# Patient Record
Sex: FEMALE | Race: BLACK OR AFRICAN AMERICAN
[De-identification: names, ages, dates, MRNs, and addresses within clinical notes are randomized per-mention and may not be internally consistent; named-entity substitution may affect disease eponyms.]

---

## 2018-02-18 ENCOUNTER — HOSPITAL ENCOUNTER (INPATIENT)
Dept: HOSPITAL 96 - M.ERS | Age: 58
LOS: 6 days | Discharge: HOME HEALTH | DRG: 418 | End: 2018-02-24
Attending: INTERNAL MEDICINE | Admitting: INTERNAL MEDICINE
Payer: COMMERCIAL

## 2018-02-18 VITALS — SYSTOLIC BLOOD PRESSURE: 188 MMHG | DIASTOLIC BLOOD PRESSURE: 95 MMHG

## 2018-02-18 VITALS — BODY MASS INDEX: 37.25 KG/M2 | WEIGHT: 205 LBS | HEIGHT: 62.01 IN

## 2018-02-18 VITALS — DIASTOLIC BLOOD PRESSURE: 76 MMHG | SYSTOLIC BLOOD PRESSURE: 146 MMHG

## 2018-02-18 VITALS — SYSTOLIC BLOOD PRESSURE: 163 MMHG | DIASTOLIC BLOOD PRESSURE: 85 MMHG

## 2018-02-18 VITALS — SYSTOLIC BLOOD PRESSURE: 150 MMHG | DIASTOLIC BLOOD PRESSURE: 79 MMHG

## 2018-02-18 DIAGNOSIS — I42.9: ICD-10-CM

## 2018-02-18 DIAGNOSIS — G89.29: ICD-10-CM

## 2018-02-18 DIAGNOSIS — I50.9: ICD-10-CM

## 2018-02-18 DIAGNOSIS — I50.32: ICD-10-CM

## 2018-02-18 DIAGNOSIS — J98.11: ICD-10-CM

## 2018-02-18 DIAGNOSIS — M54.9: ICD-10-CM

## 2018-02-18 DIAGNOSIS — R09.02: ICD-10-CM

## 2018-02-18 DIAGNOSIS — Z95.0: ICD-10-CM

## 2018-02-18 DIAGNOSIS — I13.0: ICD-10-CM

## 2018-02-18 DIAGNOSIS — K21.9: ICD-10-CM

## 2018-02-18 DIAGNOSIS — N17.9: ICD-10-CM

## 2018-02-18 DIAGNOSIS — M10.9: ICD-10-CM

## 2018-02-18 DIAGNOSIS — K80.63: Primary | ICD-10-CM

## 2018-02-18 DIAGNOSIS — K44.9: ICD-10-CM

## 2018-02-18 DIAGNOSIS — E11.22: ICD-10-CM

## 2018-02-18 DIAGNOSIS — R65.10: ICD-10-CM

## 2018-02-18 DIAGNOSIS — M19.90: ICD-10-CM

## 2018-02-18 DIAGNOSIS — F32.9: ICD-10-CM

## 2018-02-18 DIAGNOSIS — I24.9: ICD-10-CM

## 2018-02-18 DIAGNOSIS — N18.9: ICD-10-CM

## 2018-02-18 LAB
ABSOLUTE BASOPHILS: 0.1 THOU/UL (ref 0–0.2)
ABSOLUTE EOSINOPHILS: 0.2 THOU/UL (ref 0–0.7)
ABSOLUTE MONOCYTES: 1.1 THOU/UL (ref 0–1.2)
ALBUMIN SERPL-MCNC: 3.2 G/DL (ref 3.4–5)
ALP SERPL-CCNC: 128 U/L (ref 46–116)
ALT SERPL-CCNC: 107 U/L (ref 30–65)
ANION GAP SERPL CALC-SCNC: 7 MMOL/L (ref 7–16)
AST SERPL-CCNC: 111 U/L (ref 15–37)
BASOPHILS NFR BLD AUTO: 0.8 %
BILIRUB SERPL-MCNC: 1.4 MG/DL
BILIRUB UR-MCNC: NEGATIVE MG/DL
BUN SERPL-MCNC: 22 MG/DL (ref 7–18)
CALCIUM SERPL-MCNC: 8.9 MG/DL (ref 8.5–10.1)
CHLORIDE SERPL-SCNC: 104 MMOL/L (ref 98–107)
CO2 SERPL-SCNC: 29 MMOL/L (ref 21–32)
COLOR UR: YELLOW
CREAT SERPL-MCNC: 1.4 MG/DL (ref 0.6–1.3)
EOSINOPHIL NFR BLD: 2.7 %
GLUCOSE SERPL-MCNC: 157 MG/DL (ref 70–99)
GRANULOCYTES NFR BLD MANUAL: 72.3 %
HCT VFR BLD CALC: 40.9 % (ref 37–47)
HGB BLD-MCNC: 13.5 GM/DL (ref 12–15)
KETONES UR STRIP-MCNC: NEGATIVE MG/DL
LIPASE: 120 U/L (ref 73–393)
LYMPHOCYTES # BLD: 1 THOU/UL (ref 0.8–5.3)
LYMPHOCYTES NFR BLD AUTO: 11.2 %
MCH RBC QN AUTO: 30.9 PG (ref 26–34)
MCHC RBC AUTO-ENTMCNC: 33.1 G/DL (ref 28–37)
MCV RBC: 93.4 FL (ref 80–100)
MONOCYTES NFR BLD: 13 %
MPV: 9.1 FL. (ref 7.2–11.1)
NEUTROPHILS # BLD: 6.2 THOU/UL (ref 1.6–8.1)
NUCLEATED RBCS: 0 /100WBC
PLATELET COUNT*: 380 THOU/UL (ref 150–400)
POTASSIUM SERPL-SCNC: 3.9 MMOL/L (ref 3.5–5.1)
PROT SERPL-MCNC: 7.6 G/DL (ref 6.4–8.2)
PROT UR QL STRIP: (no result)
RBC # BLD AUTO: 4.38 MIL/UL (ref 4.2–5)
RBC # UR STRIP: NEGATIVE /UL
RDW-CV: 15.4 % (ref 10.5–14.5)
SODIUM SERPL-SCNC: 140 MMOL/L (ref 136–145)
SP GR UR STRIP: 1.02 (ref 1–1.03)
TROPONIN-I LEVEL: <0.06 NG/ML (ref ?–0.06)
URINE CLARITY: CLEAR
URINE GLUCOSE-RANDOM: NEGATIVE
URINE LEUKOCYTES-REFLEX: NEGATIVE
URINE NITRITE-REFLEX: NEGATIVE
UROBILINOGEN UR STRIP-ACNC: >= 8 E.U./DL (ref 0.2–1)
WBC # BLD AUTO: 8.5 THOU/UL (ref 4–11)

## 2018-02-18 NOTE — NUR
PATIENT ADMITTED FROM ER TO ROOM 114. ALERT AND ORIENTED. RUQ PAIN PARTIALLY
CONTROLLED WITH MORPHINE. NAUSEATED- ZOFRAN GIVEN. IVF AND FLAGYL INFUSING AT
THIS TIME. PATIENT HAS H/O CHF-INFORMED DR. SPENCER THAT FLUIDS ARE RUNNING
AT 150ML/HR, AND PATIENT HAS WHEEZES AND PRODUCTIVE COUGH-ORDERS TO DECREASE
FLUIDS TO 75ML/HR, AND GIVE LASIX IV X1. ADMISSION HISTORY AND ASSESSMENT AS
CHARTED. VSS. O2 SAT ON2L- 96%. PATIENT VOIDED UPON ARRIVAL. ORIETNED TO ROOM.
ORAL SWABS PROVIDED.GI AND SURGERY CONSULTED.  CALL LIGHT WITHIN REACH. WILL
CONTINUE TO MONITOR.

## 2018-02-18 NOTE — NUR
I WAS PAGED BY ORTHO WHILE IN THE ER AND RETURNED THE CALL. I WAS TOLD THAT
THE PT  DID NOT RECIEVE A BREATHING TREATMENT.  I WAS UNAWARE OF ANY
TREATMENTS BEING DUE ON THAT FLOOR AS THE BOARD IN OUR DEPT DID NOT REFLECT
ANY PT'S.  I WAS TOLD THAT WAS NOT CORRECT. I STATED I WOULD HEAD DOWN THERE
AFTER I HAD FINISHED IN THE ER.  WHEN I WAS DONE IN THE ER I LOOKED UP THE PT
ON ORTHO AND REALIZED IT WAS A NEW ORDER AND WAS (QID) WITH NO (PRN) ORDERS.
THE FIRST TREATMENT WAS DUE AT 2000 (8PM).  I CALLED THE ORTHO FLOOR TO
EXPLAIN THE CONFLICT AND SUGGESTED A PRN ORDER WOULD BE HELPFUL IN THIS
SITUATION.

## 2018-02-18 NOTE — PROC
[address]  [address]                    PROCEDURE REPORT              
_______________________________________________________________________________
 
Name:       MOON CLARK                  Room:           [address] [place]  
.R.#:  [id number]      Account #:      [id number]  
Admission:  02/18/18     Attend Phys:    Jerson Gallagher MD 
Discharge:               Date of Birth:  [birth year]  
         Report #: [id number]
                                                                                
_______________________________________________________________________________
THIS REPORT FOR:  //name//                      
 
For GI report, please see the Provation report in Perceptive 7 content.
 
 
 
 
 
 
 
 
 
 
 
 
 
 
 
 
 
 
 
 
 
 
 
 
 
 
 
 
 
 
 
 
 
 
 
 
 
 
 
 
 
                       
                                        By:                                
                 
D: 02/20/18     _______________________________________
T: 02/23/18 0636Medical Records Staff JA       /AL

## 2018-02-19 VITALS — DIASTOLIC BLOOD PRESSURE: 90 MMHG | SYSTOLIC BLOOD PRESSURE: 177 MMHG

## 2018-02-19 VITALS — SYSTOLIC BLOOD PRESSURE: 154 MMHG | DIASTOLIC BLOOD PRESSURE: 79 MMHG

## 2018-02-19 VITALS — DIASTOLIC BLOOD PRESSURE: 58 MMHG | SYSTOLIC BLOOD PRESSURE: 170 MMHG

## 2018-02-19 VITALS — DIASTOLIC BLOOD PRESSURE: 80 MMHG | SYSTOLIC BLOOD PRESSURE: 160 MMHG

## 2018-02-19 VITALS — SYSTOLIC BLOOD PRESSURE: 162 MMHG | DIASTOLIC BLOOD PRESSURE: 78 MMHG

## 2018-02-19 LAB
ABSOLUTE BASOPHILS: 0.1 THOU/UL (ref 0–0.2)
ABSOLUTE EOSINOPHILS: 0.3 THOU/UL (ref 0–0.7)
ABSOLUTE MONOCYTES: 1.4 THOU/UL (ref 0–1.2)
ALBUMIN SERPL-MCNC: 3.1 G/DL (ref 3.4–5)
ALP SERPL-CCNC: 96 U/L (ref 46–116)
ALT SERPL-CCNC: 123 U/L (ref 30–65)
ANION GAP SERPL CALC-SCNC: 10 MMOL/L (ref 7–16)
AST SERPL-CCNC: 85 U/L (ref 15–37)
BASOPHILS NFR BLD AUTO: 1.1 %
BILIRUB SERPL-MCNC: 0.8 MG/DL
BUN SERPL-MCNC: 18 MG/DL (ref 7–18)
CALCIUM SERPL-MCNC: 8.6 MG/DL (ref 8.5–10.1)
CHLORIDE SERPL-SCNC: 106 MMOL/L (ref 98–107)
CO2 SERPL-SCNC: 28 MMOL/L (ref 21–32)
CREAT SERPL-MCNC: 1.3 MG/DL (ref 0.6–1.3)
EOSINOPHIL NFR BLD: 3.5 %
GLUCOSE SERPL-MCNC: 90 MG/DL (ref 70–99)
GRANULOCYTES NFR BLD MANUAL: 66 %
HCT VFR BLD CALC: 36.5 % (ref 37–47)
HGB BLD-MCNC: 12.1 GM/DL (ref 12–15)
LYMPHOCYTES # BLD: 1 THOU/UL (ref 0.8–5.3)
LYMPHOCYTES NFR BLD AUTO: 12.1 %
MAGNESIUM SERPL-MCNC: 1.6 MG/DL (ref 1.8–2.4)
MCH RBC QN AUTO: 30.8 PG (ref 26–34)
MCHC RBC AUTO-ENTMCNC: 33.1 G/DL (ref 28–37)
MCV RBC: 93 FL (ref 80–100)
MONOCYTES NFR BLD: 17.3 %
MPV: 8.9 FL. (ref 7.2–11.1)
NEUTROPHILS # BLD: 5.4 THOU/UL (ref 1.6–8.1)
NUCLEATED RBCS: 0 /100WBC
PHOSPHATE SERPL-MCNC: 4.4 MG/DL (ref 2.5–4.9)
PLATELET COUNT*: 324 THOU/UL (ref 150–400)
POTASSIUM SERPL-SCNC: 4.1 MMOL/L (ref 3.5–5.1)
PROT SERPL-MCNC: 6.8 G/DL (ref 6.4–8.2)
RBC # BLD AUTO: 3.93 MIL/UL (ref 4.2–5)
RDW-CV: 14.8 % (ref 10.5–14.5)
SODIUM SERPL-SCNC: 144 MMOL/L (ref 136–145)
WBC # BLD AUTO: 8.2 THOU/UL (ref 4–11)

## 2018-02-19 NOTE — NUR
PATIENT REMAINS ALERT AND ORIENTED. PAIN CONTROLLED WITH MORPHINE. ZOFRAN FOR
NAUSEA. VOIDING PER BSC. IVF SL THIS AFTERNOON. PATIENT HAD RUQ PAIN AFTER
HEART HEALTHY DIET FOR LUNCH. DR. CHELSY DURAN, EGD PLANNED FOR TOMORROW DUE
TO HISTORY OF GASTRITIS. PATIENT WILL BE NPO AFTER 0600. POSSIBLE SURGERY WED.
DR. FOSTER CONSULTED FOR PRE OP CLEARANCE. CALL LIGHT WITHIN REACH. WILL
CONTINUE TO MONITOR.

## 2018-02-19 NOTE — NUR
SPOKE WITH DR. VALENTINO REGARDING PLANS FOR PATIENT TODAY. PER DR. VALENTINO WE CAN
DO AN MRCP IF POSSIBLE, HOWEVER PATIENT HAS DEFIBRILLATOR. SPOKE WITH RAMANA
IN MRI AND THE DEFIBRILLATOR IS NOT COMPATIBLE WITH MRI.  STATED IF
UNABLE TO DO MRI SHE CAN EAT. CALLED SURGICAL RESIDENT TO INFORM THEM OF GI'S
RECOMENDATION. AWAITING RETURN CALL.

## 2018-02-19 NOTE — NUR
UP WITH STAND BY ASSIST TO BEDSIDE COMMODE.  ALERT AND ORIENTED.  LUNG SOUNDS
COARSE AND WITH WHEEZES AT BEGINNING OF SHIFT.  BREATHING TREATMENT GIVEN AND
BY END OF SHIFT PATIENT STATED SHE WAS FEELING MUCH BETTER AND LUNGS BETTER
WITHOUT WHEEZES.  O2 SAT 93% WITH O2 AT 2L/NC.  NEW IV STARTED WITHOUT
DIFFICLUTY.  PATIENT NPO AT THIS TIME. CALL LIGHT WITHIN REACH.

## 2018-02-19 NOTE — EKG
[place] [address]
Phone:  [phone]                     ELECTROCARDIOGRAM REPORT      
_______________________________________________________________________________
 
Name:       EDUARDOMOON S                  Room:           [address]    ADM IN  
[facility]#:  [id number]      Account #:      [id number]  
Admission:  18     Attend Phys:    Jerson Gallagher MD 
Discharge:               Date of Birth:  [birth year]  
         Report #: [id number]
    [id number]
_______________________________________________________________________________
THIS REPORT FOR:  //name//                      
 
                         [place] ED
                                       
Test Date:    2018               Test Time:    10:07:49
Pat Name:     MOON CLARK              Department:   
Patient ID:   [id number]            Room:         [facility]
Gender:       F                        Technician:   [facility]
:          1960               Requested By: Magda Rocha
Order Number: [id number] MD:   Jermaine Sharpe
                                 Measurements
Intervals                              Axis          
Rate:         79                       P:            11
UT:           187                      QRS:          -41
QRSD:         113                      T:            86
QT:           433                                    
QTc:          497                                    
                           Interpretive Statements
Sinus rhythm
Probable left atrial enlargement
Left ventricular hypertrophy
Nonspecific T abnormalities, lateral leads
Borderline prolonged QT interval
Compared to ECG 08/10/2017 13:46:47
rate increased
 
Electronically Signed On 2018 10:49:19 CST by Jermaine Sharpe
[email or web address]
 
 
 
 
 
 
 
 
 
 
 
 
 
 
 
  <ELECTRONICALLY SIGNED>
                                           By: Jermaine Sharpe MD, FACC      
  18     1049
D: 18 1007   _____________________________________
T: 18 1007   Jermaine Sharpe MD, FAC        /EPI

## 2018-02-20 VITALS — SYSTOLIC BLOOD PRESSURE: 143 MMHG | DIASTOLIC BLOOD PRESSURE: 70 MMHG

## 2018-02-20 VITALS — DIASTOLIC BLOOD PRESSURE: 68 MMHG | SYSTOLIC BLOOD PRESSURE: 155 MMHG

## 2018-02-20 VITALS — DIASTOLIC BLOOD PRESSURE: 75 MMHG | SYSTOLIC BLOOD PRESSURE: 143 MMHG

## 2018-02-20 VITALS — DIASTOLIC BLOOD PRESSURE: 73 MMHG | SYSTOLIC BLOOD PRESSURE: 148 MMHG

## 2018-02-20 VITALS — SYSTOLIC BLOOD PRESSURE: 167 MMHG | DIASTOLIC BLOOD PRESSURE: 66 MMHG

## 2018-02-20 VITALS — DIASTOLIC BLOOD PRESSURE: 70 MMHG | SYSTOLIC BLOOD PRESSURE: 143 MMHG

## 2018-02-20 LAB
ABSOLUTE BASOPHILS: 0.1 THOU/UL (ref 0–0.2)
ABSOLUTE EOSINOPHILS: 0.4 THOU/UL (ref 0–0.7)
ABSOLUTE MONOCYTES: 1.4 THOU/UL (ref 0–1.2)
ALBUMIN SERPL-MCNC: 2.9 G/DL (ref 3.4–5)
ALBUMIN SERPL-MCNC: 3 G/DL (ref 3.4–5)
ALP SERPL-CCNC: 85 U/L (ref 46–116)
ALP SERPL-CCNC: 86 U/L (ref 46–116)
ALT SERPL-CCNC: 103 U/L (ref 30–65)
ALT SERPL-CCNC: 103 U/L (ref 30–65)
ANION GAP SERPL CALC-SCNC: 5 MMOL/L (ref 7–16)
AST SERPL-CCNC: 54 U/L (ref 15–37)
AST SERPL-CCNC: 55 U/L (ref 15–37)
BASOPHILS NFR BLD AUTO: 0.8 %
BILIRUB DIRECT SERPL-MCNC: 0.2 MG/DL
BILIRUB SERPL-MCNC: 0.8 MG/DL
BILIRUB SERPL-MCNC: 0.8 MG/DL
BUN SERPL-MCNC: 18 MG/DL (ref 7–18)
CALCIUM SERPL-MCNC: 8.4 MG/DL (ref 8.5–10.1)
CHLORIDE SERPL-SCNC: 106 MMOL/L (ref 98–107)
CO2 SERPL-SCNC: 31 MMOL/L (ref 21–32)
CREAT SERPL-MCNC: 1.3 MG/DL (ref 0.6–1.3)
EOSINOPHIL NFR BLD: 6 %
GLUCOSE SERPL-MCNC: 104 MG/DL (ref 70–99)
GRANULOCYTES NFR BLD MANUAL: 59.1 %
HCT VFR BLD CALC: 36.4 % (ref 37–47)
HGB BLD-MCNC: 11.8 GM/DL (ref 12–15)
LYMPHOCYTES # BLD: 1.1 THOU/UL (ref 0.8–5.3)
LYMPHOCYTES NFR BLD AUTO: 15.1 %
MCH RBC QN AUTO: 30.6 PG (ref 26–34)
MCHC RBC AUTO-ENTMCNC: 32.5 G/DL (ref 28–37)
MCV RBC: 94.1 FL (ref 80–100)
MONOCYTES NFR BLD: 19 %
MPV: 8.7 FL. (ref 7.2–11.1)
NEUTROPHILS # BLD: 4.2 THOU/UL (ref 1.6–8.1)
NUCLEATED RBCS: 0 /100WBC
PLATELET COUNT*: 292 THOU/UL (ref 150–400)
POTASSIUM SERPL-SCNC: 3.8 MMOL/L (ref 3.5–5.1)
PROT SERPL-MCNC: 6.9 G/DL (ref 6.4–8.2)
PROT SERPL-MCNC: 6.9 G/DL (ref 6.4–8.2)
RBC # BLD AUTO: 3.87 MIL/UL (ref 4.2–5)
RDW-CV: 14.7 % (ref 10.5–14.5)
SODIUM SERPL-SCNC: 142 MMOL/L (ref 136–145)
WBC # BLD AUTO: 7.2 THOU/UL (ref 4–11)

## 2018-02-20 NOTE — CON
[address]  [address]                    CONSULTATION                  
_______________________________________________________________________________
 
Name:       MOON CLARK                  Room:           [address] [place]  
.R.#:  [id number]      Account #:      [id number]  
Admission:  02/18/18     Attend Phys:    Jerson Gallagher MD 
Discharge:               Date of Birth:  [birth year]  
         Report #: [id number]
                                                                     [id number]  
_______________________________________________________________________________
THIS REPORT FOR:  //name//                      
 
CC: Jerson Terry
 
DATE OF SERVICE:  02/19/2018
 
 
HISTORY OF PRESENT ILLNESS:  The patient is a 57-year-old single black female
who was asked to see in the hospital today for preoperative evaluation.  The
patient presented in 2013 with chest pain and shortness of breath.  She saw Dr. Velez and had a heart catheterization.  This showed normal coronary arteries
and an ejection fraction of 25%.  She is felt to have a nonischemic
cardiomyopathy.  She was discharged with LifeVest.  After discharge, the
LifeVest shocked her.  She was admitted to [facility] and Dr. Garcia implanted a single lead [employer] defibrillator in 12/2013.  She
has done well since that time.  Her last echocardiogram here at [facility] was
in 06/2017 that showed an ejection fraction of only 45%.  Because of insurance,
she has recently been followed by Dr. Derick scott at [place].  She apparently
had her defibrillator checked there at [place] in December.  She denied any
recent discharges.  The patient is not very active because of chronic back pain.
 She uses a walker.  She apparently had back surgery last May at [place]. 
The patient recently has been having nausea and vomiting.  She came to the
Emergency Room yesterday.  There are plans for cholecystectomy.  I was asked to
see her for preoperative evaluation.  She denied any significant chest pain,
increased shortness of breath, edema, palpitations, recent discharge of her
defibrillator.
 
PAST MEDICAL HISTORY:  Significant for 3 back surgeries now.  She has a history
of hypertension, diabetes.  She has a history of depression.
 
CURRENT MEDICATIONS:  Include Lasix, Cymbalta, ProAir, allopurinol, carvedilol,
glimepiride, and losartan.
 
ALLERGIES:  SHE HAS INTOLERANCE TO MORPHINE.
 
FAMILY HISTORY:  Father with heart disease.
 
SOCIAL HISTORY:  She is single.  She does have 2 daughters, never been . 
Used to work in real estate.  She is now on disability.  She previously smoked
marijuana years ago, rarely drinks alcohol, no tobacco use.
 
REVIEW OF SYSTEMS:  She has had no history of stroke.  She does have history of
asthma.  No history of peptic ulcer disease or liver disease.  She has chronic
kidney disease.  She has a history of depression.  No chronic skin condition. 
She is overweight, standing 5 feet 4 inches and weighing 200 pounds.
 
 
 
[place] [address]                    CONSULTATION                  
_______________________________________________________________________________
 
Name:       MOON CLARK                  Room:           [address] [place]  
[facility]#:  [id number]      Account #:      [id number]  
Admission:  02/18/18     Attend Phys:    Jerson Gallagher MD 
Discharge:               Date of Birth:  [birth year]  
         Report #: [id number]
                                                                     [id number]  
_______________________________________________________________________________
 
PHYSICAL EXAMINATION:
GENERAL:  Revealed a middle-aged black female, lying in bed.  She appeared in no
distress.
VITAL SIGNS:  She had a blood pressure of 140/70, pulse is 80.  She is afebrile.
HEENT:  She was anicteric, conjunctiva pink.  Mucous membranes moist.
NECK:  Veins are difficult to assess due to obesity.  No carotid bruits.
CHEST:  Clear to auscultation.
HEART:  Regular rate and rhythm.
ABDOMEN:  Obese, soft, nontender.
EXTREMITIES:  Had no edema.  Dorsalis pedis pulse 1+ bilaterally.
SKIN:  Cool and dry.
NEUROLOGIC:  Nonfocal.
LYMPH:  No adenopathy.
MUSCULOSKELETAL:  No joint effusion.
 
RADIOLOGICAL DATA:  ECG showed a sinus rhythm, left axis, nonspecific ST-segment
changes, left ventricular hypertrophy.
 
LABORATORY DATA:  Sodium 144, BUN 18, glucose 90.  Her SGPT 123, SGOT 85,
bilirubin 0.8, alkaline phosphatase 96.  Troponin 0.06.  BNP 1378.  Her white
blood cell count is 8.2, hemoglobin 12.1.  Workup so far, she had abdominal
ultrasound done yesterday that showed gallstones, gallbladder wall thickening,
and possible cholecystitis.
 
IMPRESSION AND RECOMMENDATIONS:
1.  Gallstones.  The patient might require gallbladder surgery.  The patient
appears to have no cardiac contraindication to cholecystectomy, although she
will be at small cardiac risk for postoperative complications because of her
history of nonischemic cardiomyopathy and previous defibrillator.  I would
recommend turning off therapy by her defibrillator during surgery.
2.  Cardiomyopathy.  The patient is on an ARB and beta blocker.
3.  Hypertension.  Appears controlled at this time.
4.  Diabetes.
5.  Chronic back pain.
6.  History of asthma.
 
 
 
 
 
 
 
 
<ELECTRONICALLY SIGNED>
                                        By:  Jermaine Sharpe MD, FACC      
02/20/18     1736
D: 02/19/18 1125_______________________________________
T: 02/19/18 1937Jermaine Sharpe MD, FACC         /nt

## 2018-02-20 NOTE — NUR
PATIENT ALERT AND ORIENTED. VITALS STABLE. ON 1L OF OXYGEN. COMPLAINTS OF
HEADACHE AND BACK PAIN. IV PAIN MEDICATION GIVEN. ZOFRAN GIVEN
PROPHYLACTICALLY. UP SBA TO BSC. WILL BE NPO AT 0600 FOR EGD. HOURLY ROUNDS.
NURSING WILL CONTINUE TO MONITOR.

## 2018-02-20 NOTE — NUR
NURSING SUPERVISOR CALLED [facility] TO NOTIFIY THEM OF THE NEED TO
INTERROGATE DEFIBRILLATOR POST OP TOMORROW.

## 2018-02-20 NOTE — NUR
PATIENT RETURNED FROM EGD AT THIS TIME. TOLERATING LIQUIDS. ALLOPURINOL
RESTARTED THIS AFTERNOON DUE TO GOUT FLARE IN LEFT FOOT PER PATIENT. ABDOMINAL
PAIN CONTROLLED WITH MORPHINE. PATIENT WILL BE NPO AFTER MIDNIGHT FOR SURGERY
TOMORROW. PATIENT SITS EDGE OF BED FREQUENTLY. DOES NOT AMBULATE MUCH NORMALLY
DUE TO BACK PAIN AND KNEE PAIN. IV SALINE LOCKED. CALL LIGHT WITHIN REACH.
WILL CONTINUE TO MONITOR.

## 2018-02-21 VITALS — DIASTOLIC BLOOD PRESSURE: 93 MMHG | SYSTOLIC BLOOD PRESSURE: 172 MMHG

## 2018-02-21 VITALS — DIASTOLIC BLOOD PRESSURE: 73 MMHG | SYSTOLIC BLOOD PRESSURE: 148 MMHG

## 2018-02-21 VITALS — SYSTOLIC BLOOD PRESSURE: 162 MMHG | DIASTOLIC BLOOD PRESSURE: 82 MMHG

## 2018-02-21 LAB
ABSOLUTE BASOPHILS: 0.1 THOU/UL (ref 0–0.2)
ABSOLUTE EOSINOPHILS: 0.2 THOU/UL (ref 0–0.7)
ABSOLUTE MONOCYTES: 1.4 THOU/UL (ref 0–1.2)
ALBUMIN SERPL-MCNC: 3 G/DL (ref 3.4–5)
ALP SERPL-CCNC: 79 U/L (ref 46–116)
ALT SERPL-CCNC: 84 U/L (ref 30–65)
ANION GAP SERPL CALC-SCNC: 7 MMOL/L (ref 7–16)
AST SERPL-CCNC: 42 U/L (ref 15–37)
BASOPHILS NFR BLD AUTO: 1 %
BILIRUB SERPL-MCNC: 0.7 MG/DL
BUN SERPL-MCNC: 13 MG/DL (ref 7–18)
CALCIUM SERPL-MCNC: 8.4 MG/DL (ref 8.5–10.1)
CHLORIDE SERPL-SCNC: 106 MMOL/L (ref 98–107)
CO2 SERPL-SCNC: 29 MMOL/L (ref 21–32)
CREAT SERPL-MCNC: 1.4 MG/DL (ref 0.6–1.3)
EOSINOPHIL NFR BLD: 2 %
GLUCOSE SERPL-MCNC: 103 MG/DL (ref 70–99)
GRANULOCYTES NFR BLD MANUAL: 69 %
HCT VFR BLD CALC: 38.1 % (ref 37–47)
HGB BLD-MCNC: 12.2 GM/DL (ref 12–15)
LYMPHOCYTES # BLD: 0.7 THOU/UL (ref 0.8–5.3)
LYMPHOCYTES NFR BLD AUTO: 9 %
MCH RBC QN AUTO: 30.3 PG (ref 26–34)
MCHC RBC AUTO-ENTMCNC: 31.9 G/DL (ref 28–37)
MCV RBC: 95.1 FL (ref 80–100)
METAMYELOCYTES NFR BLD: 1 %
MONOCYTES NFR BLD: 18 %
MPV: 8.4 FL. (ref 7.2–11.1)
NEUTROPHILS # BLD: 5.5 THOU/UL (ref 1.6–8.1)
NUCLEATED RBCS: 0 /100WBC
PLATELET # BLD EST: ADEQUATE 10*3/UL
PLATELET COUNT*: 294 THOU/UL (ref 150–400)
POTASSIUM SERPL-SCNC: 3.5 MMOL/L (ref 3.5–5.1)
PROT SERPL-MCNC: 7 G/DL (ref 6.4–8.2)
RBC # BLD AUTO: 4.01 MIL/UL (ref 4.2–5)
RBC MORPH BLD: NORMAL
RDW-CV: 14.9 % (ref 10.5–14.5)
SODIUM SERPL-SCNC: 142 MMOL/L (ref 136–145)
WBC # BLD AUTO: 7.8 THOU/UL (ref 4–11)

## 2018-02-21 NOTE — NUR
PATIENT REMAINED ALERT AND ORIENTED X'S 4. VITAL SIGNS AND SPO2 STABLE. NO IV.
PAIN CONTROLLED WITH PAIN MEDS. PATIENT LEFT UNIT AT 1400 FOR PACU, HAS NOT
RETURNED TO UNIT YET. COMPLETED HOURLY ROUNDING. WILL CONTINUE TO MONITOR.

## 2018-02-21 NOTE — OP
[address]  [address]                    OPERATIVE REPORT              
_______________________________________________________________________________
 
Name:       MOON CLARK                  Room:           [address] [place]  
.R.#:  [id number]      Account #:      [id number]  
Admission:  02/18/18     Attend Phys:    Jerson Gallagher MD 
Discharge:               Date of Birth:  [birth year]  
         Report #: [id number]
                                                                     [id number]  
_______________________________________________________________________________
THIS REPORT FOR:  //name//                      
 
CC: Jerson Terry
 
DATE OF SERVICE:  02/21/2018
 
 
PREPROCEDURE DIAGNOSES:  Acute cholecystitis with cholelithiasis without
obstruction with dilated common bile duct and elevated liver function tests.
 
POSTOPERATIVE DIAGNOSES:  Acute cholecystitis with cholelithiasis without
obstruction with dilated common bile duct and elevated liver function tests.
 
FINDINGS:  Distended gallbladder with some mild bile staining around the
gallbladder.  There were several small stones identified within the gallbladder.
 Intraoperative cholangiogram did identify dilated common bile duct with no
signs of obstruction.  There was free flow of contrast into the duodenum.
 
SURGEON:  Emma Farnsworth DO.
 
COSURGEON:  Niall Mercado, PGY-1.
 
ASSISTANT:  SALLY Turner.
 
PROCEDURE PERFORMED:  Laparoscopic cholecystectomy with intraoperative
cholangiogram and surgeon interpretation of images.
 
ANESTHESIA:  General endotracheal and local.
 
ESTIMATED BLOOD LOSS:  5 mL.
 
DRAINS:  None.
 
SPECIMENS:  Gallbladder.
 
COMPLICATIONS:  None.
 
CONDITION:  Stable.
 
DISPOSITION:  PACU to the floor.
 
HISTORY OF PRESENT ILLNESS:  The patient is a very pleasant 57-year-old female
who presented to the hospital with a complaint of right upper quadrant abdominal
pain associated with nausea.  She underwent a CT scan and an ultrasound with
findings of a dilated gallbladder with stones in the neck of the gallbladder. 
 
 
 
[id number] [id number]                    OPERATIVE REPORT              
_______________________________________________________________________________
 
Name:       MOON CLARK                  Room:           [address] [place]  
[facility].#:  [id number]      Account #:      [id number]  
Admission:  02/18/18     Attend Phys:    Jerson Gallagher MD 
Discharge:               Date of Birth:  [birth year]  
         Report #: [id number]
                                                                     [id number]  
_______________________________________________________________________________
CBD was also found to be dilated at 10 mm.  Her LFTs were also elevated.  She
was unable to undergo an MRCP due to a defibrillator placement.  GI was
consulted.  LFTs were trended and did improve.  She underwent an EGD yesterday
with no acute findings.  She was then consented for laparoscopic cholecystectomy
with intraoperative cholangiogram.  Risks discussed included bleeding,
infection, pain, scar formation, injury to bowel, liver or bile duct, hernia at
the incision sites, need for an open procedure and risks of general anesthesia. 
The patient understood these risks and elected to proceed.
 
DESCRIPTION OF PROCEDURE:  The patient was brought to the operating room.  She
was laid supine on the operating room table.  SCDs were placed on bilateral
lower extremities.  Antibiotics were given in the perioperative period.  General
endotracheal anesthesia was induced by Anesthesia without difficulty.  Abdomen
was prepped and draped in standard sterile fashion.  Timeout was performed to
verify patient and procedure.  A 10 mL 0.5% Marcaine was injected in the
supraumbilical area.  Incision was made with an 11 blade.  Cautery was used for
hemostasis.  S retractors were used to visualize the fascia.  Fascia was grasped
and elevated between two Kochers.  Fascia was incised using cautery.  Peritoneum
was bluntly entered using a Jaycee clamp.  Finger was swept into the abdomen to
assure that there were no manuela-incisional adhesions.  Adhesions were identified.
 They appeared to be omentum.  These were gently swept to the side.  Two
stitches of 0 Vicryl were then placed on the fascia.  Concepción trocar was
introduced and secured with 0 Vicryl stitches.  Abdomen was insufflated.  The
patient was placed head up and tilted left side down.  Camera was introduced and
a brief anterior abdominal exploration was undertaken with findings of a dilated
gallbladder with some mild bile staining in the right upper quadrant and again
there were a few adhesions between the omentum and the pelvis.  An 11 mm trocar
was introduced in the subxiphoid area as well as two 5 mm trocars in the right
upper quadrant.  Gallbladder was grasped and elevated.  Peritoneum overlying the
triangle of Calot was incised using cautery.  Both duct and artery were then
easily visualized, both were circumferentially dissected using a [place]
dissector.  Sagastume clamp was then introduced through a 5 mm trocar and the neck
of the gallbladder was grasped without difficulty.  Self-penetrating catheter
was also introduced and bile was easily aspirated.  Saline was easily flushed. 
The patient was placed supine and C-arm was brought onto the field. 
Intraoperative cholangiogram was then performed without difficulty.  The cystic
duct was patent, common bile duct and the left and right hepatic radicals were
easily identified.  Contrast freely flowed into the duodenum.  There was no sign
of any obstruction or retained stone, but the common bile duct was dilated. 
Cholangiogram catheter was removed as was the Sagastume clamp.  The patient was
returned to reverse Trendelenburg and left side down.  Three clips were then
placed proximally and distally on the duct, 2 clips were placed proximally and
distally on the artery.  Both were sharply incised with scissors.  Gallbladder
was then removed from the liver bed utilizing cautery with no further
difficulty.  Specimen was placed within an EndoCatch bag.  Right upper quadrant
was irrigated until clear.  Liver bed was inspected.  It was hemostatic.  Clips
 
 
 
[place] [id number]                    OPERATIVE REPORT              
_______________________________________________________________________________
 
Name:       MOON CLARK                  Room:           [address] [place]  
[facility]#:  [id number]      Account #:      [id number]  
Admission:  02/18/18     Attend Phys:    Jerson Gallagher MD 
Discharge:               Date of Birth:  [birth year]  
         Report #: [id number]
                                                                     [id number]  
_______________________________________________________________________________
were inspected.  They appeared to be intact.  There was no bleeding or leakage
noted from the area of the clips.  Trocars were removed under direct
visualization.  There was no bleeding noted from the peritoneum.  Abdomen was
then completely desufflated.  Concepción trocar was removed and EndoCatch bag was
removed with specimen intact.  Kochers were placed on the fascia of right
infraumbilical port.  Previously placed 0 Vicryl stitches were removed and a 0
Vicryl stitch was placed in figure-of-eight fashion with excellent approximation
of the fascia.  An additional 10 mL of 0.5% Marcaine was injected in the fascia.
 This wound was then closed in a layered fashion using deep and superficial
stitches of 3-0 Vicryl in inverted interrupted fashion.  All skin wounds were
closed with 4-0 Monocryl.  A total of 50 mL of 0.5% Marcaine was used to
anesthetize the wounds.  Wounds were then cleansed and covered with Mastisol,
Steri-Strips, 4 x 4s, and a Tegaderm.  The patient was then allowed to awaken
from anesthesia, was extubated and transported to the recovery room with no
further difficulties.  Counts were correct at the conclusion of the case.
 
 
 
 
 
 
 
 
 
 
 
 
 
 
 
 
 
 
 
 
 
 
 
 
 
 
 
 
 
<ELECTRONICALLY SIGNED>
                                        By:  Emma Farnsworth,          
02/21/18     1730
D: 02/21/18 1700_______________________________________
T: 02/21/18 1722Cjer Farnsworth DO            /nt

## 2018-02-21 NOTE — NUR
PATIENT ALERT AND ORIENTED. VITALS STABLE. RA. COMPLAINTS OF CHRONIC BACK AND
LEFT FOOT PAIN. MORPHINE GIVEN, EFFECTIVE. UP SBA TO BSC. WILL BE NPO AT 0700
FOR [facility] [place]. HOURLY ROUNDS. NURSING WILL CONTINUE TO MONITOR.

## 2018-02-22 VITALS — DIASTOLIC BLOOD PRESSURE: 74 MMHG | SYSTOLIC BLOOD PRESSURE: 158 MMHG

## 2018-02-22 VITALS — DIASTOLIC BLOOD PRESSURE: 77 MMHG | SYSTOLIC BLOOD PRESSURE: 147 MMHG

## 2018-02-22 VITALS — DIASTOLIC BLOOD PRESSURE: 55 MMHG | SYSTOLIC BLOOD PRESSURE: 127 MMHG

## 2018-02-22 VITALS — DIASTOLIC BLOOD PRESSURE: 75 MMHG | SYSTOLIC BLOOD PRESSURE: 160 MMHG

## 2018-02-22 LAB
ALBUMIN SERPL-MCNC: 3.1 G/DL (ref 3.4–5)
ALP SERPL-CCNC: 95 U/L (ref 46–116)
ALT SERPL-CCNC: 100 U/L (ref 30–65)
ANION GAP SERPL CALC-SCNC: 8 MMOL/L (ref 7–16)
AST SERPL-CCNC: 73 U/L (ref 15–37)
BILIRUB SERPL-MCNC: 0.8 MG/DL
BUN SERPL-MCNC: 13 MG/DL (ref 7–18)
CALCIUM SERPL-MCNC: 8.5 MG/DL (ref 8.5–10.1)
CHLORIDE SERPL-SCNC: 102 MMOL/L (ref 98–107)
CO2 SERPL-SCNC: 30 MMOL/L (ref 21–32)
CREAT SERPL-MCNC: 1.6 MG/DL (ref 0.6–1.3)
GLUCOSE SERPL-MCNC: 187 MG/DL (ref 70–99)
HCT VFR BLD CALC: 36.9 % (ref 37–47)
HGB BLD-MCNC: 11.9 GM/DL (ref 12–15)
MCH RBC QN AUTO: 30.6 PG (ref 26–34)
MCHC RBC AUTO-ENTMCNC: 32.2 G/DL (ref 28–37)
MCV RBC: 95 FL (ref 80–100)
MPV: 8.7 FL. (ref 7.2–11.1)
PLATELET COUNT*: 284 THOU/UL (ref 150–400)
POTASSIUM SERPL-SCNC: 3.2 MMOL/L (ref 3.5–5.1)
PROT SERPL-MCNC: 7.3 G/DL (ref 6.4–8.2)
RBC # BLD AUTO: 3.88 MIL/UL (ref 4.2–5)
RDW-CV: 14.8 % (ref 10.5–14.5)
SODIUM SERPL-SCNC: 140 MMOL/L (ref 136–145)
WBC # BLD AUTO: 14.9 THOU/UL (ref 4–11)

## 2018-02-22 NOTE — NUR
PATIENT REMAINED ALERT AND ORIENTED X'S 4. VITAL SIGNS AND SPO2 STABLE. PAIN
WELL CONTROLLED WITH PAIN MEDS. PATIENT WAS UNABLE TO URINATE THROUGHOUT
SHIFT. SHE WAS STRAIGHT CATHED  CAME OUT, THEN BLADDER SCANNED HER,
NOTHING WAS LEFT IN THE BLADDER. SHE MOVES PRETTY SLOW BUT IS STABLE, 1
ASSIST. DRESSING OVER ABDOMEN CLEAN, DRY, INTACT. IV FLUSHING, CLEAN, INTACT.
COMPLETED HOURLY ROUNDING. CALL LIGHT WITHIN REACH. WILL CONTINUE TO MONITOR.

## 2018-02-22 NOTE — NUR
PT.SITTING ON SIDE OF BED. STATED SHE IS FEELING BETTER. HOPES TO GO HOME
TOMORROW. STATED DAUGHTER SHAMA LIVES WITH HER IN AN APT. HER DAUGHTER HELPS
HER AS NEEDED. THERE ARE 14 STAIRS FROM MAIN LEVEL OF APT.TO PT.'S BEDROOM.
SHE SAID SHE CRAWLS UP THE STAIRS. HAS A HX OF BACK SURGERY. SHE IS ABLE TO
WALK DOWN THEM. SHE HAS A WC,ROLLATOR WALKER AND WC. SHE STAYS IN HER ROOM
MOST OF THE TIME. DAUGHTER BRINGS FOOD UP TO HER FOR MEALS. THERE IS A
BATHROOM ON UPPER LEVEL. SHE MAINLY TAKES SPONGE BATHS. SHE HAS A BATH BENCH
BUT DAUGHTER DOESN'T LIKE IT TO STAY IN THE BATHROOM SO IT HAS TO BE CARRIED
IN THERE EACH TIME PT.WANTS TO TAKE A BATH. SHE SAID I DON'T LIKE TO BOTHER
HER FOR IT.SHE WOULD BE AGREEABLE TO HOME HEALTH. [facility] AND WOULD
LIKE TO [facility] AGAIN. CM WILL FOLLOW FOR DISCHARGE.

## 2018-02-23 VITALS — DIASTOLIC BLOOD PRESSURE: 72 MMHG | SYSTOLIC BLOOD PRESSURE: 165 MMHG

## 2018-02-23 VITALS — SYSTOLIC BLOOD PRESSURE: 138 MMHG | DIASTOLIC BLOOD PRESSURE: 55 MMHG

## 2018-02-23 VITALS — DIASTOLIC BLOOD PRESSURE: 81 MMHG | SYSTOLIC BLOOD PRESSURE: 136 MMHG

## 2018-02-23 VITALS — DIASTOLIC BLOOD PRESSURE: 59 MMHG | SYSTOLIC BLOOD PRESSURE: 121 MMHG

## 2018-02-23 VITALS — SYSTOLIC BLOOD PRESSURE: 134 MMHG | DIASTOLIC BLOOD PRESSURE: 76 MMHG

## 2018-02-23 LAB
ABSOLUTE BASOPHILS: 0.1 THOU/UL (ref 0–0.2)
ABSOLUTE EOSINOPHILS: 0.3 THOU/UL (ref 0–0.7)
ABSOLUTE MONOCYTES: 1.6 THOU/UL (ref 0–1.2)
ANION GAP SERPL CALC-SCNC: 20 MMOL/L (ref 7–16)
BASOPHILS NFR BLD AUTO: 1 %
BUN SERPL-MCNC: 13 MG/DL (ref 7–18)
CALCIUM SERPL-MCNC: 8.7 MG/DL (ref 8.5–10.1)
CHLORIDE SERPL-SCNC: 105 MMOL/L (ref 98–107)
CO2 SERPL-SCNC: 17 MMOL/L (ref 21–32)
CREAT SERPL-MCNC: 1.3 MG/DL (ref 0.6–1.3)
EOSINOPHIL NFR BLD: 2 %
GLUCOSE SERPL-MCNC: 95 MG/DL (ref 70–99)
GRANULOCYTES NFR BLD MANUAL: 79 %
HCT VFR BLD CALC: 35.2 % (ref 37–47)
HGB BLD-MCNC: 11.4 GM/DL (ref 12–15)
LYMPHOCYTES # BLD: 1 THOU/UL (ref 0.8–5.3)
LYMPHOCYTES NFR BLD AUTO: 7 %
MCH RBC QN AUTO: 30.1 PG (ref 26–34)
MCHC RBC AUTO-ENTMCNC: 32.5 G/DL (ref 28–37)
MCV RBC: 92.7 FL (ref 80–100)
MONOCYTES NFR BLD: 11 %
MPV: 8.4 FL. (ref 7.2–11.1)
NEUTROPHILS # BLD: 11.8 THOU/UL (ref 1.6–8.1)
NUCLEATED RBCS: 0 /100WBC
PLATELET # BLD EST: ADEQUATE 10*3/UL
PLATELET COUNT*: 265 THOU/UL (ref 150–400)
POTASSIUM SERPL-SCNC: 4.1 MMOL/L (ref 3.5–5.1)
RBC # BLD AUTO: 3.8 MIL/UL (ref 4.2–5)
RBC MORPH BLD: NORMAL
RDW-CV: 14.7 % (ref 10.5–14.5)
SODIUM SERPL-SCNC: 142 MMOL/L (ref 136–145)
WBC # BLD AUTO: 14.9 THOU/UL (ref 4–11)

## 2018-02-23 NOTE — S
[address]  [address]                    SURGICAL PATH RPT PROCEDURE   
_______________________________________________________________________________
 
Name:       MOON CYR                  Room:           [address] [place]  
..#:  [id number]      Account #:      [id number]  
Admission:  02/18/18     Date of Birth:  [birth year]  
Discharge:                             Report #:    [id number]
                                                         Path Case #: [id number] 
_______________________________________________________________________________
 
  
PATHOLOGY REPORT 
    
COLLECTION DATE: 2/21/2018   RECEIVED DATE: 2/22/2018  
 SUBMITTING PHYS: Dr. Emma Farnsworth
OTHER PHYS:
Dr. Jerson Terry
   
SPECIMEN(S) RECEIVED:
A.Gallbladder
    
* * * * * * * * * * * *
   
FINAL DIAGNOSIS:
Gallbladder:
- Chronic cholecystitis with cholesterolosis.
(DANA:db; 2/23/2018) 
 
PATHOLOGIST:   Tyrone Dominguez M.D. 
REPORT ELECTRONICALLY SIGNED BY:   Tyrone Dominguez M.D.
DATE/TIME:   2/23/2018 14:03
    
* * * * * * * * * * * *
 
GROSS PATHOLOGY:
Received in formalin labeled "Moon Cyr gallbladder" and consists
of an intact 8.7 x 4.6 cm gallbladder.  The serosa is glistening and
dark green.  The margin is inked.  The lumen contains 20 cc of thick
green bile.  No calculi are present.  The wall averages 0.2 cm thick.
 The mucosa is velvety, green, and shows staining yellow flecks. 
Representative sections are submitted as A1.
(DUSTIN; 2/22/2018)
 
 
CLINICAL HISTORY:
Acute cholecystitis
 
INITIAL CPT CODE(S):
A; 64775
Professional services performed by [facility] at [facility], [address] [place] [address]., [place] [address].  Technical
services performed by [facility] at [address] [place] [address], [facility] [address] [address].
 
 
   
[facility]
 
[facility] 
[address] [place]  [address]                    SURGICAL PATH RPT PROCEDURE   
_______________________________________________________________________________
 
Name:       MOON CYR                  Room:           [address] [place]  
..#:  [id number]      Account #:      [id number]  
Admission:  02/18/18     Date of Birth:  [birth year]  
Discharge:                             Report #:    [id number]
                                                         Path Case #: [id number] 
_______________________________________________________________________________
[address] [address]
PHONE:  [phone]
DIRECTOR:  Spencer W. Kerley, M.D.
* * *  END OF REPORT  * * *

## 2018-02-23 NOTE — NUR
PATIENT HAS REMAINED ALERT AND ORIENTED X 4 THROUGHOUT THE SHIFT AND RESTING
QUIETLY ON HOURLY ROUNDS. MEDICATED X 4 FOR ABDOMINAL PAIN. O2 HAS BEEN
DECREASED TO 1.5 L/MIN WITH SATS >92%. RT TREATMENTS CONTINUE. LAP SITES TO
ABDOMEN CLEAN AND DRY. NO NAUSEA. PASSING SOME GAS. TOLERATING CLEAR DIET. PER
DAYSHIFT REPORT PATIENT HAD BEEN UNABLE TO VOID POST-OP AND STRAIGHT CATH X 2
HAD BEEN PROVIDED. THE LAST PER REPORT 1600 2/22/18 WITH 300 ML RETURN. AT
MIDNIGHT PATIENT UP TO [facility] AND WAS ABLE TO VOID 150 ML AND AGAIN AT 0600 A
VOID  ML. POST VOID RESIDUAL 0600 SHOWING 345 ML. WILL COMMUNICATE WITH
PHYSICIAN IF FURTHER STRAIGHT CATH TO BE PROVIDED OR TO GIVE PATIENT FURTHER
TIME TO VOID ON OWN. VITAL SIGNS STABLE. CONTINUE TO MONITOR.

## 2018-02-23 NOTE — NUR
ASSUMED CARE OF PATIENT AFTER REPORT THIS MORNING.  PATIENT AWAKE, ALERT, AND
ORIENTED APPROPRIATELY.  PHYSICAL ASSESSMENT COMPLETED AND AS CHARTED.
COMPLAINED OF PAIN THIS SHIFT.  GIVEN PRN AND SCHEDULED MEDICATIONS, SEE EMAR
FOR DOCUMENTATION.  VITAL SIGNS STABLE.  OXYGEN SATURATION WITHIN NORMAL
LIMITS ON 1.5 LPM PER NASAL CANULA.  PATIENT TRANSFERS AND AMBULATES WITH
ASSISTANCE FROM STAFF.  HAD SHOWER TODAY WITH OCCUPATIONAL THERAPY.  IS
SITTING IN WHEELCHAIR AT BEDSIDE AT THIS TIME PER PATIENT REQUEST.  DENIES
NEEDS AT THIS TIME.  CALL LIGHT WITHIN REACH, USES APPROPRIATELY.  NURSING
WILL CONTINUE TO MONITOR.

## 2018-02-23 NOTE — NUR
REFERRAL MADE TO [facility] FOR WHEN PT.DISCHARGES.
IF DISCHARGED OVER THE WEEKEND, NOTIFY [phone] AND FAX DISCHARGE
ORDERS -[phone].

## 2018-02-24 VITALS — DIASTOLIC BLOOD PRESSURE: 93 MMHG | SYSTOLIC BLOOD PRESSURE: 147 MMHG

## 2018-02-24 VITALS — DIASTOLIC BLOOD PRESSURE: 50 MMHG | SYSTOLIC BLOOD PRESSURE: 136 MMHG

## 2018-02-24 NOTE — NUR
PATIENT HAS REMAINED ALERT AND ORIENTED X 4 THROUGHOUT THE SHIFT AND RESTING
QUIETLY ON HOURLY ROUNDS. IMPROVED PAIN MANAGEMENT WITH ORAL MEDS ONLY.
VOIDING ADEQUATELY AND PASSING GAS. HAS DENIED NAUSEA. LAP DRESSINGS X 3 CLEAN
AND DRY. VITAL SIGNS STABLE. CONTINUE TO MONITOR.

## 2018-02-24 NOTE — NUR
ORDERS RECEIVED FOR DC HOME WITH HH.  PT HAD PREVIOUSLY CHOSEN [facility], CONFIRMED
[facility] HER THAT IS STILL WHO SHE WANTS TO USE.  CALLED AND FAXED ORDERS TO [facility],
HAD TO LEAVE [facility]. PT DENIES OTHER NEEDS

## 2018-02-24 NOTE — NUR
ASSUMED CARE OF PATIENT AFTER MORNING REPORT. ALERT AND ORIENTED X4.
ASSESSMENT COMPLETED AND AS CHARTED. VSS ON 1.5 LITERS 02. PATIENT HAS HAD NO
COMPLAINTS OF NAUSEA THIS SHIFT. PAIN HAS BEEN MANAGED WITH PAIN MEDICATION.
PATIENT TITRATED OFF OF 02 AND PASSED THE REST AND EXERCIZE WITH RT. PATIENT
DISCHARGED AT 1625. ALL PERSONAL BELONGINGS LEFT WITH PATIENT. PRESCRIPTIONS
AND DISCHARGE INFORMATION SENT WITH PATIENT UPON DISCHARGE.

## 2018-03-08 NOTE — CON
[address]  [address]                    CONSULTATION                  
_______________________________________________________________________________
 
Name:       MOON CLARK                  Room:           [address] [place]  
M.R.#:  [id number]      Account #:      [id number]  
Admission:  02/18/18     Attend Phys:    Jerson Gallagher MD 
Discharge:  02/24/18     Date of Birth:  [birth year]  
         Report #: [id number]
                                                                     [id number]  
_______________________________________________________________________________
THIS REPORT FOR:  //name//                      
 
CC: Jerson Terry
 
DATE OF SERVICE:  02/19/2018
 
 
ADDENDUM:
 
I personally seen and examined the patient and reviewed labs and imaging.  The
patient with history of upper and lower endoscopy last year, which was
significant for diverticulosis and gastritis, who presents with few weeks of
abdominal pain.  She also gave us a history of taking Naprosyn for a month that
she finally stopped due to dyspepsia type symptoms.  Since hospitalization, she
had imaging studies suggestive of dilated common bile duct to 10 mm.  Her LFTs
were also elevated and bilirubin initially was 1.3 and down to 0.8.  She
currently feeling a little bit better and she has tolerated her meal.
 
We will go ahead and perform an upper endoscopy to rule out gastroduodenal
ulcer.  In reference to her abnormal liver enzymes, we will recommend lap digna
with IOC.  If the patient had choledocholithiasis, we will consider ERCP with
stone extraction.
 
 
 
 
 
 
 
 
 
 
 
 
 
 
 
 
 
 
 
 
 
<ELECTRONICALLY SIGNED>
                                        By:  Heather Mendes MD            
03/08/18     1450
D: 02/19/18 1414_______________________________________
T: 02/19/18 2141Heather Mendes MD               /nt

## 2018-09-30 ENCOUNTER — HOSPITAL ENCOUNTER (INPATIENT)
Dept: HOSPITAL 96 - M.ERS | Age: 58
LOS: 4 days | Discharge: HOME HEALTH | DRG: 391 | End: 2018-10-04
Attending: FAMILY MEDICINE | Admitting: FAMILY MEDICINE
Payer: COMMERCIAL

## 2018-09-30 VITALS — SYSTOLIC BLOOD PRESSURE: 198 MMHG | DIASTOLIC BLOOD PRESSURE: 98 MMHG

## 2018-09-30 VITALS — HEIGHT: 64.02 IN | WEIGHT: 195 LBS | BODY MASS INDEX: 33.29 KG/M2

## 2018-09-30 DIAGNOSIS — Z80.3: ICD-10-CM

## 2018-09-30 DIAGNOSIS — K57.92: Primary | ICD-10-CM

## 2018-09-30 DIAGNOSIS — I13.0: ICD-10-CM

## 2018-09-30 DIAGNOSIS — F41.9: ICD-10-CM

## 2018-09-30 DIAGNOSIS — R59.1: ICD-10-CM

## 2018-09-30 DIAGNOSIS — N18.3: ICD-10-CM

## 2018-09-30 DIAGNOSIS — M19.90: ICD-10-CM

## 2018-09-30 DIAGNOSIS — I50.23: ICD-10-CM

## 2018-09-30 DIAGNOSIS — Z79.899: ICD-10-CM

## 2018-09-30 DIAGNOSIS — J96.91: ICD-10-CM

## 2018-09-30 DIAGNOSIS — Z82.49: ICD-10-CM

## 2018-09-30 DIAGNOSIS — F32.9: ICD-10-CM

## 2018-09-30 DIAGNOSIS — I25.10: ICD-10-CM

## 2018-09-30 DIAGNOSIS — M10.9: ICD-10-CM

## 2018-09-30 DIAGNOSIS — I49.9: ICD-10-CM

## 2018-09-30 DIAGNOSIS — E83.42: ICD-10-CM

## 2018-09-30 DIAGNOSIS — K21.9: ICD-10-CM

## 2018-09-30 DIAGNOSIS — M48.00: ICD-10-CM

## 2018-09-30 DIAGNOSIS — E11.22: ICD-10-CM

## 2018-09-30 DIAGNOSIS — Z23: ICD-10-CM

## 2018-09-30 DIAGNOSIS — Z98.41: ICD-10-CM

## 2018-09-30 DIAGNOSIS — Z90.49: ICD-10-CM

## 2018-09-30 DIAGNOSIS — Z83.3: ICD-10-CM

## 2018-09-30 DIAGNOSIS — G89.29: ICD-10-CM

## 2018-09-30 DIAGNOSIS — J45.909: ICD-10-CM

## 2018-09-30 DIAGNOSIS — E87.6: ICD-10-CM

## 2018-09-30 DIAGNOSIS — R10.9: ICD-10-CM

## 2018-09-30 LAB
ABSOLUTE BASOPHILS: 0 THOU/UL (ref 0–0.2)
ABSOLUTE EOSINOPHILS: 0.1 THOU/UL (ref 0–0.7)
ABSOLUTE MONOCYTES: 0.9 THOU/UL (ref 0–1.2)
ALBUMIN SERPL-MCNC: 3.3 G/DL (ref 3.4–5)
ALP SERPL-CCNC: 94 U/L (ref 46–116)
ALT SERPL-CCNC: 73 U/L (ref 30–65)
ANION GAP SERPL CALC-SCNC: 10 MMOL/L (ref 7–16)
AST SERPL-CCNC: 41 U/L (ref 15–37)
BASOPHILS NFR BLD AUTO: 0.6 %
BILIRUB SERPL-MCNC: 0.8 MG/DL
BUN SERPL-MCNC: 15 MG/DL (ref 7–18)
CALCIUM SERPL-MCNC: 9 MG/DL (ref 8.5–10.1)
CHLORIDE SERPL-SCNC: 105 MMOL/L (ref 98–107)
CO2 SERPL-SCNC: 25 MMOL/L (ref 21–32)
CREAT SERPL-MCNC: 1.1 MG/DL (ref 0.6–1.3)
EOSINOPHIL NFR BLD: 1.2 %
GLUCOSE SERPL-MCNC: 138 MG/DL (ref 70–99)
GRANULOCYTES NFR BLD MANUAL: 76.9 %
HCT VFR BLD CALC: 41 % (ref 37–47)
HGB BLD-MCNC: 13.4 GM/DL (ref 12–15)
LYMPHOCYTES # BLD: 0.9 THOU/UL (ref 0.8–5.3)
LYMPHOCYTES NFR BLD AUTO: 10.8 %
MCH RBC QN AUTO: 30.2 PG (ref 26–34)
MCHC RBC AUTO-ENTMCNC: 32.7 G/DL (ref 28–37)
MCV RBC: 92.5 FL (ref 80–100)
MONOCYTES NFR BLD: 10.5 %
MPV: 9.5 FL. (ref 7.2–11.1)
NEUTROPHILS # BLD: 6.3 THOU/UL (ref 1.6–8.1)
NUCLEATED RBCS: 0 /100WBC
PLATELET COUNT*: 343 THOU/UL (ref 150–400)
POTASSIUM SERPL-SCNC: 3.1 MMOL/L (ref 3.5–5.1)
PROT SERPL-MCNC: 7.9 G/DL (ref 6.4–8.2)
RBC # BLD AUTO: 4.44 MIL/UL (ref 4.2–5)
RDW-CV: 14.9 % (ref 10.5–14.5)
SODIUM SERPL-SCNC: 140 MMOL/L (ref 136–145)
WBC # BLD AUTO: 8.2 THOU/UL (ref 4–11)

## 2018-10-01 VITALS — SYSTOLIC BLOOD PRESSURE: 160 MMHG | DIASTOLIC BLOOD PRESSURE: 83 MMHG

## 2018-10-01 VITALS — SYSTOLIC BLOOD PRESSURE: 188 MMHG | DIASTOLIC BLOOD PRESSURE: 92 MMHG

## 2018-10-01 VITALS — DIASTOLIC BLOOD PRESSURE: 94 MMHG | SYSTOLIC BLOOD PRESSURE: 184 MMHG

## 2018-10-01 VITALS — DIASTOLIC BLOOD PRESSURE: 82 MMHG | SYSTOLIC BLOOD PRESSURE: 164 MMHG

## 2018-10-01 VITALS — DIASTOLIC BLOOD PRESSURE: 86 MMHG | SYSTOLIC BLOOD PRESSURE: 157 MMHG

## 2018-10-01 VITALS — SYSTOLIC BLOOD PRESSURE: 170 MMHG | DIASTOLIC BLOOD PRESSURE: 86 MMHG

## 2018-10-01 VITALS — DIASTOLIC BLOOD PRESSURE: 74 MMHG | SYSTOLIC BLOOD PRESSURE: 151 MMHG

## 2018-10-01 LAB
BILIRUB UR-MCNC: NEGATIVE MG/DL
COLOR UR: YELLOW
KETONES UR STRIP-MCNC: NEGATIVE MG/DL
PROT UR QL STRIP: (no result)
RBC # UR STRIP: (no result) /UL
SP GR UR STRIP: <= 1.005 (ref 1–1.03)
URINE CLARITY: CLEAR
URINE GLUCOSE-RANDOM: NEGATIVE
URINE LEUKOCYTES-REFLEX: NEGATIVE
URINE NITRITE-REFLEX: NEGATIVE
UROBILINOGEN UR STRIP-ACNC: 0.2 E.U./DL (ref 0.2–1)

## 2018-10-02 VITALS — SYSTOLIC BLOOD PRESSURE: 168 MMHG | DIASTOLIC BLOOD PRESSURE: 82 MMHG

## 2018-10-02 VITALS — DIASTOLIC BLOOD PRESSURE: 84 MMHG | SYSTOLIC BLOOD PRESSURE: 150 MMHG

## 2018-10-02 VITALS — DIASTOLIC BLOOD PRESSURE: 119 MMHG | SYSTOLIC BLOOD PRESSURE: 198 MMHG

## 2018-10-02 VITALS — DIASTOLIC BLOOD PRESSURE: 100 MMHG | SYSTOLIC BLOOD PRESSURE: 170 MMHG

## 2018-10-02 LAB
ANION GAP SERPL CALC-SCNC: 8 MMOL/L (ref 7–16)
BUN SERPL-MCNC: 9 MG/DL (ref 7–18)
CALCIUM SERPL-MCNC: 8.2 MG/DL (ref 8.5–10.1)
CHLORIDE SERPL-SCNC: 107 MMOL/L (ref 98–107)
CO2 SERPL-SCNC: 23 MMOL/L (ref 21–32)
CREAT SERPL-MCNC: 1.2 MG/DL (ref 0.6–1.3)
EST. AVERAGE GLUCOSE BLD GHB EST-MCNC: 134 MG/DL
GLUCOSE SERPL-MCNC: 159 MG/DL (ref 70–99)
GLYCOHEMOGLOBIN (HGB A1C): 6.3 % (ref 4.8–5.6)
HCT VFR BLD CALC: 37.6 % (ref 37–47)
HGB BLD-MCNC: 12.2 GM/DL (ref 12–15)
MAGNESIUM SERPL-MCNC: 1.6 MG/DL (ref 1.8–2.4)
MAGNESIUM SERPL-MCNC: 1.7 MG/DL (ref 1.8–2.4)
MCH RBC QN AUTO: 30.2 PG (ref 26–34)
MCHC RBC AUTO-ENTMCNC: 32.5 G/DL (ref 28–37)
MCV RBC: 93 FL (ref 80–100)
MPV: 9.6 FL. (ref 7.2–11.1)
PLATELET COUNT*: 280 THOU/UL (ref 150–400)
POTASSIUM SERPL-SCNC: 3 MMOL/L (ref 3.5–5.1)
POTASSIUM SERPL-SCNC: 4 MMOL/L (ref 3.5–5.1)
RBC # BLD AUTO: 4.04 MIL/UL (ref 4.2–5)
RDW-CV: 15.6 % (ref 10.5–14.5)
SODIUM SERPL-SCNC: 138 MMOL/L (ref 136–145)
WBC # BLD AUTO: 7.1 THOU/UL (ref 4–11)

## 2018-10-03 VITALS — SYSTOLIC BLOOD PRESSURE: 193 MMHG | DIASTOLIC BLOOD PRESSURE: 103 MMHG

## 2018-10-03 VITALS — SYSTOLIC BLOOD PRESSURE: 168 MMHG | DIASTOLIC BLOOD PRESSURE: 89 MMHG

## 2018-10-03 VITALS — DIASTOLIC BLOOD PRESSURE: 90 MMHG | SYSTOLIC BLOOD PRESSURE: 157 MMHG

## 2018-10-03 VITALS — SYSTOLIC BLOOD PRESSURE: 191 MMHG | DIASTOLIC BLOOD PRESSURE: 101 MMHG

## 2018-10-03 VITALS — SYSTOLIC BLOOD PRESSURE: 187 MMHG | DIASTOLIC BLOOD PRESSURE: 105 MMHG

## 2018-10-03 VITALS — DIASTOLIC BLOOD PRESSURE: 101 MMHG | SYSTOLIC BLOOD PRESSURE: 183 MMHG

## 2018-10-04 VITALS — DIASTOLIC BLOOD PRESSURE: 95 MMHG | SYSTOLIC BLOOD PRESSURE: 180 MMHG

## 2018-10-04 LAB
ABSOLUTE MONOCYTES: 0.7 THOU/UL (ref 0–1.2)
ANION GAP SERPL CALC-SCNC: 7 MMOL/L (ref 7–16)
ANISOCYTOSIS BLD QL SMEAR: (no result)
BUN SERPL-MCNC: 10 MG/DL (ref 7–18)
CALCIUM SERPL-MCNC: 8.7 MG/DL (ref 8.5–10.1)
CHLORIDE SERPL-SCNC: 105 MMOL/L (ref 98–107)
CO2 SERPL-SCNC: 26 MMOL/L (ref 21–32)
CREAT SERPL-MCNC: 1.1 MG/DL (ref 0.6–1.3)
GLUCOSE SERPL-MCNC: 167 MG/DL (ref 70–99)
GRANULOCYTES NFR BLD MANUAL: 84 %
HCT VFR BLD CALC: 36 % (ref 37–47)
HGB BLD-MCNC: 12.1 GM/DL (ref 12–15)
LYMPHOCYTES # BLD: 0.6 THOU/UL (ref 0.8–5.3)
LYMPHOCYTES NFR BLD AUTO: 7 %
MAGNESIUM SERPL-MCNC: 1.6 MG/DL (ref 1.8–2.4)
MCH RBC QN AUTO: 30.9 PG (ref 26–34)
MCHC RBC AUTO-ENTMCNC: 33.4 G/DL (ref 28–37)
MCV RBC: 92.3 FL (ref 80–100)
MONOCYTES NFR BLD: 9 %
MPV: 8.8 FL. (ref 7.2–11.1)
NEUTROPHILS # BLD: 6.9 THOU/UL (ref 1.6–8.1)
NUCLEATED RBCS: 1 /100WBC
PLATELET # BLD EST: ADEQUATE 10*3/UL
PLATELET COUNT*: 301 THOU/UL (ref 150–400)
POIKILOCYTOSIS BLD QL SMEAR: (no result)
POTASSIUM SERPL-SCNC: 3.3 MMOL/L (ref 3.5–5.1)
RBC # BLD AUTO: 3.9 MIL/UL (ref 4.2–5)
RDW-CV: 15.4 % (ref 10.5–14.5)
SODIUM SERPL-SCNC: 138 MMOL/L (ref 136–145)
WBC # BLD AUTO: 8.2 THOU/UL (ref 4–11)

## 2018-10-15 NOTE — CON
[address]  [address]                    CONSULTATION                  
_______________________________________________________________________________
 
Name:       MOON CLARK                  Room:           [address] [place]  
M.R.#:  [id number]      Account #:      [id number]  
Admission:  09/30/18     Attend Phys:    Antonio Erazo MD
Discharge:  10/04/18     Date of Birth:  [birth year]  
         Report #: [id number]
                                                                     [id number]  
_______________________________________________________________________________
THIS REPORT FOR:  //name//                      
 
CC: Jermaine Erazo
 
HISTORY OF PRESENT ILLNESS:  This is a very pleasant 58-year-old
-American female with past medical history of hypertension, diabetes,
GERD, spinal stenosis and prior history of diverticulitis, who is presenting
with abdominal pain.  The patient reports abdominal pain is located in the left
upper quadrant and left flank and is associated with fevers and chills.  She
reports associated diarrhea and nausea, but denies any vomiting.  The patient
had recent cholecystectomy in February of this year and reports that she was
diagnosed with diverticulitis around the same time.  She reports the pain is
localized, nonradiating and has no particular association with food or bowel
movements.  The patient denies any hematemesis, hematochezia, or recent weight
loss.
 
PAST MEDICAL HISTORY:  The patient has history of coronary artery disease,
congestive heart failure, spinal stenosis, diabetes, hypertension and one
episode of diverticulitis in the past:
 
PAST SURGICAL HISTORY:  The patient had a cholecystectomy in February of this
year and has had 4 back surgeries.
 
SOCIAL HISTORY:  She denies smoking or alcohol use, but the patient does admit
smoking marijuana at least 1-2 times per week.  She also reports that she takes
brownies infused with marijuana on a regular basis to help with her back pain.
 
FAMILY HISTORY:  Significant for breast cancer in both mother and sister,
otherwise is negative.
 
REVIEW OF SYSTEMS:  A comprehensive 10-point review of systems is negative
except for what is mentioned here.
 
PHYSICAL EXAMINATION:
VITAL SIGNS:  Temperature 36.7, pulse rate 81, respirations 16, blood pressure
151/74, pulse ox 96% on room air.
GENERAL:  The patient is alert, awake, oriented x 3.
HEENT:  Pupils are equal, round, reactive to light and accommodation.  Mucous
membranes are moist.
NECK:  Supple.  There is no congestion.  There is no supraclavicular
lymphadenopathy.
CARDIOVASCULAR:  Rate and rhythm regular; S1, S2 present.
LUNGS:  Clear to auscultation bilaterally.
ABDOMEN:  Soft, mild tenderness in the left upper quadrant and left lower
quadrant.  There is no guarding or rigidity.  Bowel sounds are present.
 
 
 
[place] [address]                    CONSULTATION                  
_______________________________________________________________________________
 
Name:       MOON CLARK                  Room:           [address]#:  [id number]      Account #:      [id number]  
Admission:  09/30/18     Attend Phys:    Antonio Erazo MD
Discharge:  10/04/18     Date of Birth:  [birth year]  
         Report #: [id number]
                                                                     [id number]  
_______________________________________________________________________________
EXTREMITIES:  Warm and well perfused.  There is no edema.
SKIN:  Warm and dry.
 
LABORATORY DATA:  Hemoglobin 13.4, hematocrit 41, platelet count 343, WBC count
8.2.  Sodium 140, potassium 3.1, chloride 105, bicarbonate 25, BUN 15,
creatinine 1.1, glucose 138, total bilirubin 0.8, AST 41, ALT 73, alkaline
phosphatase 94, albumin 3.3.  Abdomen and pelvis CT with IV contrast,
retroperitoneal adenopathy is noted with mildly enlarged retroperitoneal nodes. 
This includes left _____ periaortic lymph node measuring 16 x 13 mm and
additional left paraaortic lymph node measuring 11 x 7 mm and aortocaval node
measuring 7 x 12 mm.  There is no significant iliac or inguinal lymphadenopathy.
 There is one mildly enlarged mesenteric lymph node, there is some mild
stranding and inflammatory change around sigmoid colon, descending colon
suggesting diverticulitis.  Appendix is normal.
 
ASSESSMENT AND PLAN:  This is a very pleasant 58-year-old female with a past
medical history of hypertension, diabetes, congestive heart failure, and one
episode of diverticulitis, presenting with recurrent left upper left flank pain
with CT evidence of possible diverticulitis in the descending and sigmoid colon.
 CT also demonstrates worsening retroperitoneal lymphadenopathy.  Additionally,
the patient appears to have mildly elevated AST and ALT.
1.  Diverticulitis.
2.  Retroperitoneal lymphadenopathy.
3.  Elevated liver enzymes.
 
I would recommend p.o. Cipro and Flagyl for 7-10 days for the patient's
diverticulitis and advance her diet to regular diet.
 
I would recommend an outpatient EUS and FNA to evaluate the retroperitoneal
lymphadenopathy.  This can be arranged in 3-4 weeks' time.
 
The patient has had chronic elevation of her liver enzymes, most likely due to
fatty liver disease.
 
 
 
 
 
 
 
 
 
 
 
<ELECTRONICALLY SIGNED>
                                        By:  Jose Martin Sharp MD         
10/15/18     1123
D: 10/01/18 2201_______________________________________
T: 10/02/18 1532Jose Martin Sharp MD            /nt

## 2021-05-14 NOTE — NUR
1LITER BAG NS AND FLAGYL WERE PULLED FOR PATIENT AND SENT TO [facility] WITH PATIENT
AS THE [facility] WAS STILL INFUSING No significant past surgical history